# Patient Record
Sex: FEMALE | Race: WHITE | Employment: UNEMPLOYED | URBAN - METROPOLITAN AREA
[De-identification: names, ages, dates, MRNs, and addresses within clinical notes are randomized per-mention and may not be internally consistent; named-entity substitution may affect disease eponyms.]

---

## 2022-05-05 ENCOUNTER — HOSPITAL ENCOUNTER (OUTPATIENT)
Dept: PREADMISSION TESTING | Age: 42
Discharge: HOME | End: 2022-05-05

## 2022-05-05 VITALS — WEIGHT: 235 LBS | HEIGHT: 63 IN | BODY MASS INDEX: 41.64 KG/M2

## 2022-05-05 RX ORDER — SODIUM CHLORIDE, SODIUM LACTATE, POTASSIUM CHLORIDE, CALCIUM CHLORIDE 600; 310; 30; 20 MG/100ML; MG/100ML; MG/100ML; MG/100ML
125 INJECTION, SOLUTION INTRAVENOUS CONTINUOUS
Status: CANCELLED | OUTPATIENT
Start: 2022-05-05

## 2022-05-05 RX ORDER — CEFAZOLIN SODIUM/WATER 2 G/20 ML
2 SYRINGE (ML) INTRAVENOUS ONCE
Status: CANCELLED | OUTPATIENT
Start: 2022-05-10 | End: 2022-05-10

## 2022-05-05 NOTE — PERIOP NOTES
Leave all valuables at home or loved ones;to include wallets/purse, money/credit cards, electronics  such as laptops and tablets. If you want to have your prescriptions filled here, please have some form of payment with your . Please arrange for your transportation home Hold supplements 2 weeks prior to [place]. Denies any prosthetics. Patient states that the family physician is not aware of upcoming procedure. Stop nsaids 7 days prior to [place]. Do not put any lotion, jewelry, makeup, fingernail or toenail polish; no wigs, no private piercings; no tictac,gum and mouthwash. Denies sleep apnea. Denies family history of anesthesia complications. Denies shortness of breath nor chest pain while climbing stairs. Please be aware that due to unforeseen circumstances, delays may occur and your patience will be appreciated. If you ae scheduled to be discharged the same day, please plan to be with us for most of the day. If an inpatient, room assignments may be delayed as well. Our priority is to make you as comfortable as possible and to keep your family informed of your status when possible. NO dnr.   Patient to see surgeon on 5-6-22; states she has not received information for eras

## 2022-05-06 ENCOUNTER — HOSPITAL ENCOUNTER (OUTPATIENT)
Dept: LAB | Age: 42
Discharge: HOME | End: 2022-05-06

## 2022-05-06 ENCOUNTER — TRANSCRIBE ORDER (OUTPATIENT)
Dept: REGISTRATION | Age: 42
End: 2022-05-06

## 2022-05-06 DIAGNOSIS — E66.3 SEVERELY OVERWEIGHT: Primary | ICD-10-CM

## 2022-05-06 LAB
SENTARA SPECIMEN COL,SENBCF: NORMAL
SENTARA SPECIMEN COL,SENBCF: NORMAL

## 2022-05-06 PROCEDURE — 99001 SPECIMEN HANDLING PT-LAB: CPT

## 2022-05-10 ENCOUNTER — ANESTHESIA EVENT (OUTPATIENT)
Dept: SURGERY | Age: 42
End: 2022-05-10
Payer: MEDICAID

## 2022-05-10 ENCOUNTER — ANESTHESIA (OUTPATIENT)
Dept: SURGERY | Age: 42
End: 2022-05-10
Payer: MEDICAID

## 2022-05-10 ENCOUNTER — HOSPITAL ENCOUNTER (OUTPATIENT)
Age: 42
Setting detail: OUTPATIENT SURGERY
Discharge: HOME | End: 2022-05-10
Attending: OBSTETRICS & GYNECOLOGY | Admitting: OBSTETRICS & GYNECOLOGY
Payer: MEDICAID

## 2022-05-10 VITALS
SYSTOLIC BLOOD PRESSURE: 128 MMHG | OXYGEN SATURATION: 100 % | WEIGHT: 227.4 LBS | BODY MASS INDEX: 40.29 KG/M2 | RESPIRATION RATE: 20 BRPM | HEIGHT: 63 IN | TEMPERATURE: 97.6 F | HEART RATE: 84 BPM | DIASTOLIC BLOOD PRESSURE: 79 MMHG

## 2022-05-10 PROBLEM — R87.613 PAPANICOLAOU SMEAR OF CERVIX WITH HIGH GRADE SQUAMOUS INTRAEPITHELIAL LESION (HGSIL): Status: RESOLVED | Noted: 2022-05-10 | Resolved: 2022-05-10

## 2022-05-10 PROBLEM — R87.613 PAPANICOLAOU SMEAR OF CERVIX WITH HIGH GRADE SQUAMOUS INTRAEPITHELIAL LESION (HGSIL): Status: ACTIVE | Noted: 2022-05-10

## 2022-05-10 PROBLEM — R87.613 PAPANICOLAOU SMEAR OF CERVIX WITH HIGH GRADE SQUAMOUS INTRAEPITHELIAL LESION (HGSIL): Chronic | Status: RESOLVED | Noted: 2022-05-10 | Resolved: 2022-05-10

## 2022-05-10 PROBLEM — R87.613 PAPANICOLAOU SMEAR OF CERVIX WITH HIGH GRADE SQUAMOUS INTRAEPITHELIAL LESION (HGSIL): Chronic | Status: ACTIVE | Noted: 2022-05-10

## 2022-05-10 LAB
ABO + RH BLD: NORMAL
ATRIAL RATE: 81 BPM
BLOOD GROUP ANTIBODIES SERPL: NORMAL
CALCULATED P AXIS, ECG09: 23 DEGREES
CALCULATED R AXIS, ECG10: 56 DEGREES
CALCULATED T AXIS, ECG11: 55 DEGREES
DIAGNOSIS, 93000: NORMAL
HCG UR QL: NEGATIVE
P-R INTERVAL, ECG05: 174 MS
Q-T INTERVAL, ECG07: 418 MS
QRS DURATION, ECG06: 92 MS
QTC CALCULATION (BEZET), ECG08: 485 MS
SPECIMEN EXP DATE BLD: NORMAL
VENTRICULAR RATE, ECG03: 81 BPM

## 2022-05-10 PROCEDURE — 2709999900 HC NON-CHARGEABLE SUPPLY: Performed by: OBSTETRICS & GYNECOLOGY

## 2022-05-10 PROCEDURE — 77030006643: Performed by: INTERNAL MEDICINE

## 2022-05-10 PROCEDURE — 76060000033 HC ANESTHESIA 1 TO 1.5 HR: Performed by: OBSTETRICS & GYNECOLOGY

## 2022-05-10 PROCEDURE — 77030008683 HC TU ET CUF COVD -A: Performed by: INTERNAL MEDICINE

## 2022-05-10 PROCEDURE — 77030031492 HC PRT ACC BLNT AIRSEAL CNMD -B: Performed by: OBSTETRICS & GYNECOLOGY

## 2022-05-10 PROCEDURE — 77030020703 HC SEAL CANN DISP INTU -B: Performed by: OBSTETRICS & GYNECOLOGY

## 2022-05-10 PROCEDURE — 36415 COLL VENOUS BLD VENIPUNCTURE: CPT

## 2022-05-10 PROCEDURE — 88307 TISSUE EXAM BY PATHOLOGIST: CPT

## 2022-05-10 PROCEDURE — 77030020782 HC GWN BAIR PAWS FLX 3M -B: Performed by: OBSTETRICS & GYNECOLOGY

## 2022-05-10 PROCEDURE — 74011250636 HC RX REV CODE- 250/636: Performed by: OBSTETRICS & GYNECOLOGY

## 2022-05-10 PROCEDURE — 81025 URINE PREGNANCY TEST: CPT

## 2022-05-10 PROCEDURE — 76210000017 HC OR PH I REC 1.5 TO 2 HR: Performed by: OBSTETRICS & GYNECOLOGY

## 2022-05-10 PROCEDURE — 74011000250 HC RX REV CODE- 250: Performed by: OBSTETRICS & GYNECOLOGY

## 2022-05-10 PROCEDURE — 77030040361 HC SLV COMPR DVT MDII -B: Performed by: OBSTETRICS & GYNECOLOGY

## 2022-05-10 PROCEDURE — 74011000250 HC RX REV CODE- 250: Performed by: INTERNAL MEDICINE

## 2022-05-10 PROCEDURE — 76210000021 HC REC RM PH II 0.5 TO 1 HR: Performed by: OBSTETRICS & GYNECOLOGY

## 2022-05-10 PROCEDURE — 77030035277 HC OBTRTR BLDELSS DISP INTU -B: Performed by: OBSTETRICS & GYNECOLOGY

## 2022-05-10 PROCEDURE — 93005 ELECTROCARDIOGRAM TRACING: CPT

## 2022-05-10 PROCEDURE — 77030002933 HC SUT MCRYL J&J -A: Performed by: OBSTETRICS & GYNECOLOGY

## 2022-05-10 PROCEDURE — 77030008574 HC TBNG SUC IRR STRY -B: Performed by: OBSTETRICS & GYNECOLOGY

## 2022-05-10 PROCEDURE — 77030016151 HC PROTCTR LNS DFOG COVD -B: Performed by: OBSTETRICS & GYNECOLOGY

## 2022-05-10 PROCEDURE — 88309 TISSUE EXAM BY PATHOLOGIST: CPT

## 2022-05-10 PROCEDURE — 77030025805 HC MANIP UTER RUMI COOP -B: Performed by: OBSTETRICS & GYNECOLOGY

## 2022-05-10 PROCEDURE — 77030010507 HC ADH SKN DERMBND J&J -B: Performed by: OBSTETRICS & GYNECOLOGY

## 2022-05-10 PROCEDURE — 77030008477 HC STYL SATN SLP COVD -A: Performed by: INTERNAL MEDICINE

## 2022-05-10 PROCEDURE — 74011250637 HC RX REV CODE- 250/637: Performed by: INTERNAL MEDICINE

## 2022-05-10 PROCEDURE — 77030040830 HC CATH URETH FOL MDII -A: Performed by: OBSTETRICS & GYNECOLOGY

## 2022-05-10 PROCEDURE — 77030032060 HC PWDR HEMSTAT ARISTA ASRB 3GM BARD -C: Performed by: OBSTETRICS & GYNECOLOGY

## 2022-05-10 PROCEDURE — 77030022704 HC SUT VLOC COVD -B: Performed by: OBSTETRICS & GYNECOLOGY

## 2022-05-10 PROCEDURE — 76010000934 HC OR TIME 1 TO 1.5HR INTENSV - TIER 2: Performed by: OBSTETRICS & GYNECOLOGY

## 2022-05-10 PROCEDURE — 74011250636 HC RX REV CODE- 250/636: Performed by: INTERNAL MEDICINE

## 2022-05-10 PROCEDURE — 86900 BLOOD TYPING SEROLOGIC ABO: CPT

## 2022-05-10 PROCEDURE — 77030027743 HC APPL F/HEMSTAT BARD -B: Performed by: OBSTETRICS & GYNECOLOGY

## 2022-05-10 PROCEDURE — 77030013079 HC BLNKT BAIR HGGR 3M -A: Performed by: INTERNAL MEDICINE

## 2022-05-10 RX ORDER — ONDANSETRON 2 MG/ML
INJECTION INTRAMUSCULAR; INTRAVENOUS AS NEEDED
Status: DISCONTINUED | OUTPATIENT
Start: 2022-05-10 | End: 2022-05-10 | Stop reason: HOSPADM

## 2022-05-10 RX ORDER — BUPIVACAINE HYDROCHLORIDE 2.5 MG/ML
INJECTION, SOLUTION EPIDURAL; INFILTRATION; INTRACAUDAL AS NEEDED
Status: DISCONTINUED | OUTPATIENT
Start: 2022-05-10 | End: 2022-05-10 | Stop reason: HOSPADM

## 2022-05-10 RX ORDER — SCOLOPAMINE TRANSDERMAL SYSTEM 1 MG/1
1 PATCH, EXTENDED RELEASE TRANSDERMAL
Status: DISCONTINUED | OUTPATIENT
Start: 2022-05-10 | End: 2022-05-10 | Stop reason: HOSPADM

## 2022-05-10 RX ORDER — PROPOFOL 10 MG/ML
INJECTION, EMULSION INTRAVENOUS AS NEEDED
Status: DISCONTINUED | OUTPATIENT
Start: 2022-05-10 | End: 2022-05-10 | Stop reason: HOSPADM

## 2022-05-10 RX ORDER — IBUPROFEN 400 MG/1
600 TABLET ORAL
COMMUNITY

## 2022-05-10 RX ORDER — PHENYLEPHRINE HCL IN 0.9% NACL 1 MG/10 ML
SYRINGE (ML) INTRAVENOUS AS NEEDED
Status: DISCONTINUED | OUTPATIENT
Start: 2022-05-10 | End: 2022-05-10 | Stop reason: HOSPADM

## 2022-05-10 RX ORDER — SODIUM CHLORIDE 0.9 % (FLUSH) 0.9 %
5-40 SYRINGE (ML) INJECTION AS NEEDED
Status: DISCONTINUED | OUTPATIENT
Start: 2022-05-10 | End: 2022-05-10 | Stop reason: HOSPADM

## 2022-05-10 RX ORDER — INSULIN LISPRO 100 [IU]/ML
INJECTION, SOLUTION INTRAVENOUS; SUBCUTANEOUS ONCE
Status: DISCONTINUED | OUTPATIENT
Start: 2022-05-10 | End: 2022-05-10 | Stop reason: HOSPADM

## 2022-05-10 RX ORDER — HYDROMORPHONE HYDROCHLORIDE 2 MG/ML
INJECTION, SOLUTION INTRAMUSCULAR; INTRAVENOUS; SUBCUTANEOUS AS NEEDED
Status: DISCONTINUED | OUTPATIENT
Start: 2022-05-10 | End: 2022-05-10 | Stop reason: HOSPADM

## 2022-05-10 RX ORDER — SODIUM CHLORIDE, SODIUM LACTATE, POTASSIUM CHLORIDE, CALCIUM CHLORIDE 600; 310; 30; 20 MG/100ML; MG/100ML; MG/100ML; MG/100ML
50 INJECTION, SOLUTION INTRAVENOUS CONTINUOUS
Status: DISCONTINUED | OUTPATIENT
Start: 2022-05-10 | End: 2022-05-10 | Stop reason: HOSPADM

## 2022-05-10 RX ORDER — DEXTROSE MONOHYDRATE 100 MG/ML
0-250 INJECTION, SOLUTION INTRAVENOUS AS NEEDED
Status: DISCONTINUED | OUTPATIENT
Start: 2022-05-10 | End: 2022-05-10 | Stop reason: HOSPADM

## 2022-05-10 RX ORDER — ROCURONIUM BROMIDE 10 MG/ML
INJECTION, SOLUTION INTRAVENOUS AS NEEDED
Status: DISCONTINUED | OUTPATIENT
Start: 2022-05-10 | End: 2022-05-10 | Stop reason: HOSPADM

## 2022-05-10 RX ORDER — ACETAMINOPHEN 500 MG
1000 TABLET ORAL ONCE
Status: COMPLETED | OUTPATIENT
Start: 2022-05-10 | End: 2022-05-10

## 2022-05-10 RX ORDER — LIDOCAINE HYDROCHLORIDE 20 MG/ML
INJECTION, SOLUTION EPIDURAL; INFILTRATION; INTRACAUDAL; PERINEURAL AS NEEDED
Status: DISCONTINUED | OUTPATIENT
Start: 2022-05-10 | End: 2022-05-10 | Stop reason: HOSPADM

## 2022-05-10 RX ORDER — CEFAZOLIN SODIUM/WATER 2 G/20 ML
2 SYRINGE (ML) INTRAVENOUS ONCE
Status: COMPLETED | OUTPATIENT
Start: 2022-05-10 | End: 2022-05-10

## 2022-05-10 RX ORDER — HYDROMORPHONE HYDROCHLORIDE 1 MG/ML
0.2 INJECTION, SOLUTION INTRAMUSCULAR; INTRAVENOUS; SUBCUTANEOUS AS NEEDED
Status: DISCONTINUED | OUTPATIENT
Start: 2022-05-10 | End: 2022-05-10 | Stop reason: HOSPADM

## 2022-05-10 RX ORDER — KETOROLAC TROMETHAMINE 15 MG/ML
INJECTION, SOLUTION INTRAMUSCULAR; INTRAVENOUS AS NEEDED
Status: DISCONTINUED | OUTPATIENT
Start: 2022-05-10 | End: 2022-05-10 | Stop reason: HOSPADM

## 2022-05-10 RX ORDER — SODIUM CHLORIDE 0.9 % (FLUSH) 0.9 %
5-40 SYRINGE (ML) INJECTION EVERY 8 HOURS
Status: DISCONTINUED | OUTPATIENT
Start: 2022-05-10 | End: 2022-05-10 | Stop reason: HOSPADM

## 2022-05-10 RX ORDER — DEXAMETHASONE SODIUM PHOSPHATE 4 MG/ML
INJECTION, SOLUTION INTRA-ARTICULAR; INTRALESIONAL; INTRAMUSCULAR; INTRAVENOUS; SOFT TISSUE AS NEEDED
Status: DISCONTINUED | OUTPATIENT
Start: 2022-05-10 | End: 2022-05-10 | Stop reason: HOSPADM

## 2022-05-10 RX ORDER — SODIUM CHLORIDE, SODIUM LACTATE, POTASSIUM CHLORIDE, CALCIUM CHLORIDE 600; 310; 30; 20 MG/100ML; MG/100ML; MG/100ML; MG/100ML
125 INJECTION, SOLUTION INTRAVENOUS CONTINUOUS
Status: DISCONTINUED | OUTPATIENT
Start: 2022-05-10 | End: 2022-05-10 | Stop reason: HOSPADM

## 2022-05-10 RX ORDER — MIDAZOLAM HYDROCHLORIDE 1 MG/ML
INJECTION, SOLUTION INTRAMUSCULAR; INTRAVENOUS AS NEEDED
Status: DISCONTINUED | OUTPATIENT
Start: 2022-05-10 | End: 2022-05-10 | Stop reason: HOSPADM

## 2022-05-10 RX ORDER — FENTANYL CITRATE 50 UG/ML
INJECTION, SOLUTION INTRAMUSCULAR; INTRAVENOUS AS NEEDED
Status: DISCONTINUED | OUTPATIENT
Start: 2022-05-10 | End: 2022-05-10 | Stop reason: HOSPADM

## 2022-05-10 RX ORDER — FENTANYL CITRATE 50 UG/ML
50 INJECTION, SOLUTION INTRAMUSCULAR; INTRAVENOUS
Status: DISCONTINUED | OUTPATIENT
Start: 2022-05-10 | End: 2022-05-10 | Stop reason: HOSPADM

## 2022-05-10 RX ORDER — MAGNESIUM SULFATE 100 %
4 CRYSTALS MISCELLANEOUS AS NEEDED
Status: DISCONTINUED | OUTPATIENT
Start: 2022-05-10 | End: 2022-05-10 | Stop reason: HOSPADM

## 2022-05-10 RX ADMIN — FENTANYL CITRATE 50 MCG: 50 INJECTION, SOLUTION INTRAMUSCULAR; INTRAVENOUS at 13:48

## 2022-05-10 RX ADMIN — SODIUM CHLORIDE, SODIUM LACTATE, POTASSIUM CHLORIDE, AND CALCIUM CHLORIDE 125 ML/HR: 600; 310; 30; 20 INJECTION, SOLUTION INTRAVENOUS at 14:35

## 2022-05-10 RX ADMIN — ACETAMINOPHEN 1000 MG: 500 TABLET ORAL at 11:43

## 2022-05-10 RX ADMIN — Medication 100 MCG: at 12:25

## 2022-05-10 RX ADMIN — ROCURONIUM BROMIDE 80 MG: 10 INJECTION, SOLUTION INTRAVENOUS at 12:15

## 2022-05-10 RX ADMIN — HYDROMORPHONE HYDROCHLORIDE 0.2 MG: 1 INJECTION, SOLUTION INTRAMUSCULAR; INTRAVENOUS; SUBCUTANEOUS at 14:03

## 2022-05-10 RX ADMIN — PROPOFOL 250 MG: 10 INJECTION, EMULSION INTRAVENOUS at 12:14

## 2022-05-10 RX ADMIN — KETOROLAC TROMETHAMINE 30 MG: 15 INJECTION, SOLUTION INTRAMUSCULAR; INTRAVENOUS at 13:31

## 2022-05-10 RX ADMIN — LIDOCAINE HYDROCHLORIDE 100 MG: 20 INJECTION, SOLUTION INTRAVENOUS at 12:14

## 2022-05-10 RX ADMIN — DEXAMETHASONE SODIUM PHOSPHATE 8 MG: 4 INJECTION, SOLUTION INTRAMUSCULAR; INTRAVENOUS at 12:29

## 2022-05-10 RX ADMIN — SODIUM CHLORIDE, SODIUM LACTATE, POTASSIUM CHLORIDE, AND CALCIUM CHLORIDE 125 ML/HR: 600; 310; 30; 20 INJECTION, SOLUTION INTRAVENOUS at 12:06

## 2022-05-10 RX ADMIN — FENTANYL CITRATE 50 MCG: 50 INJECTION, SOLUTION INTRAMUSCULAR; INTRAVENOUS at 12:14

## 2022-05-10 RX ADMIN — FENTANYL CITRATE 50 MCG: 50 INJECTION, SOLUTION INTRAMUSCULAR; INTRAVENOUS at 13:35

## 2022-05-10 RX ADMIN — SUGAMMADEX 206 MG: 100 INJECTION, SOLUTION INTRAVENOUS at 13:27

## 2022-05-10 RX ADMIN — FENTANYL CITRATE 50 MCG: 50 INJECTION, SOLUTION INTRAMUSCULAR; INTRAVENOUS at 13:43

## 2022-05-10 RX ADMIN — Medication 2 G: at 12:26

## 2022-05-10 RX ADMIN — HYDROMORPHONE HYDROCHLORIDE 0.5 MG: 2 INJECTION, SOLUTION INTRAMUSCULAR; INTRAVENOUS; SUBCUTANEOUS at 12:33

## 2022-05-10 RX ADMIN — SODIUM CHLORIDE, SODIUM LACTATE, POTASSIUM CHLORIDE, AND CALCIUM CHLORIDE 50 ML/HR: 600; 310; 30; 20 INJECTION, SOLUTION INTRAVENOUS at 11:43

## 2022-05-10 RX ADMIN — FENTANYL CITRATE 50 MCG: 50 INJECTION, SOLUTION INTRAMUSCULAR; INTRAVENOUS at 14:55

## 2022-05-10 RX ADMIN — ONDANSETRON HYDROCHLORIDE 4 MG: 2 INJECTION INTRAMUSCULAR; INTRAVENOUS at 13:26

## 2022-05-10 RX ADMIN — HYDROMORPHONE HYDROCHLORIDE 0.2 MG: 1 INJECTION, SOLUTION INTRAMUSCULAR; INTRAVENOUS; SUBCUTANEOUS at 14:35

## 2022-05-10 RX ADMIN — HYDROMORPHONE HYDROCHLORIDE 0.2 MG: 1 INJECTION, SOLUTION INTRAMUSCULAR; INTRAVENOUS; SUBCUTANEOUS at 14:23

## 2022-05-10 RX ADMIN — HYDROMORPHONE HYDROCHLORIDE 0.2 MG: 1 INJECTION, SOLUTION INTRAMUSCULAR; INTRAVENOUS; SUBCUTANEOUS at 13:53

## 2022-05-10 RX ADMIN — FENTANYL CITRATE 50 MCG: 50 INJECTION, SOLUTION INTRAMUSCULAR; INTRAVENOUS at 13:25

## 2022-05-10 RX ADMIN — FENTANYL CITRATE 50 MCG: 50 INJECTION, SOLUTION INTRAMUSCULAR; INTRAVENOUS at 14:45

## 2022-05-10 RX ADMIN — HYDROMORPHONE HYDROCHLORIDE 0.2 MG: 1 INJECTION, SOLUTION INTRAMUSCULAR; INTRAVENOUS; SUBCUTANEOUS at 14:13

## 2022-05-10 RX ADMIN — MIDAZOLAM 2 MG: 1 INJECTION INTRAMUSCULAR; INTRAVENOUS at 12:09

## 2022-05-10 NOTE — ANESTHESIA PREPROCEDURE EVALUATION
Relevant Problems   No relevant active problems       Anesthetic History   No history of anesthetic complications            Review of Systems / Medical History  Patient summary reviewed, nursing notes reviewed and pertinent labs reviewed    Pulmonary          Smoker         Neuro/Psych   Within defined limits           Cardiovascular  Within defined limits                Exercise tolerance: >4 METS     GI/Hepatic/Renal  Within defined limits              Endo/Other        Morbid obesity     Other Findings              Physical Exam    Airway  Mallampati: II  TM Distance: > 6 cm  Neck ROM: normal range of motion   Mouth opening: Normal     Cardiovascular    Rhythm: regular  Rate: normal         Dental  No notable dental hx       Pulmonary  Breath sounds clear to auscultation               Abdominal  GI exam deferred       Other Findings            Anesthetic Plan    ASA: 3  Anesthesia type: general    Monitoring Plan: Continuous noninvasive hemodynamic monitoring      Induction: Intravenous  Anesthetic plan and risks discussed with: Patient

## 2022-05-10 NOTE — ANESTHESIA POSTPROCEDURE EVALUATION
Procedure(s):  ROBOTIC TOTAL LAPAROSCOPIC HYSTERECTOMY,BILATERAL SALPINGECTOMIES, CYSTOSCOPY. general    Anesthesia Post Evaluation      Multimodal analgesia: multimodal analgesia used between 6 hours prior to anesthesia start to PACU discharge  Patient location during evaluation: PACU  Patient participation: complete - patient participated  Level of consciousness: sleepy but conscious  Pain score: 5  Pain management: satisfactory to patient  Airway patency: patent  Anesthetic complications: no  Cardiovascular status: blood pressure returned to baseline and hemodynamically stable  Respiratory status: room air, nonlabored ventilation and spontaneous ventilation  Hydration status: euvolemic  Post anesthesia nausea and vomiting:  controlled  Final Post Anesthesia Temperature Assessment:  Normothermia (36.0-37.5 degrees C)      INITIAL Post-op Vital signs:   Vitals Value Taken Time   /100 05/10/22 1519   Temp     Pulse 76 05/10/22 1522   Resp 16 05/10/22 1522   SpO2 97 % 05/10/22 1522   Vitals shown include unvalidated device data.

## 2022-05-10 NOTE — PERIOP NOTES
Reviewed PTA medication list with patient/caregiver and patient/caregiver denies any additional medications. Patient admits to having a responsible adult care for them at home for at least 24 hours after surgery. Patient encouraged to use gown warming system and informed that using said warming gown to regulate body temperature prior to a procedure has been shown to help reduce the risks of blood clots and infection. Patient's pharmacy of choice verified and documented in PTA medication section. Dual skin assessment & fall risk band verification completed with Gena Salgado RN.

## 2022-05-10 NOTE — DISCHARGE INSTRUCTIONS
[facility], [address], [facility] [address], [address]  [facility], [address], [address], [place], [address] [facility]  Office: [phone]  Fax:    [phone]    PROCEDURE: Procedure(s):  ROBOTIC TOTAL LAPAROSCOPIC HYSTERECTOMY,BILATERAL SALPINGECTOMIES, CYSTOSCOPY    NOTIFY [facility] IMMEDIATELY IF ANY OF THE FOLLOWING OCCUR:     You are unable to urinate. Urgency to urinate is not uncommon.  Excessive vaginal bleeding > 1 maxi pad an hour for more then 2 hours straight.  Temperature above 101.0° and / or chills.  You are nauseous and / or vomiting and you cannot hold down any fluids.  Your pain is not controlled with the pain medication prescribed. SPECIAL CONSIDERATIONS:      Do not drive for at least 24 hours after the procedure and until you are no longer taking narcotic pain medication and you are able to move and react without hesitation.  You may return to work in 2 weeks. [x] Pelvic rest (nothing in the vagina) for 6 weeks. [x] No heavy lifting over 10 pounds & no strenuous exercise for 2 weeks. [] Other instructions:         MEDICATIONS: PROVIDED AT DISCHARGE OR PREVIOUSLY PRESCRIBED AT PRE OPERATIVE APPOINTMENT WITH [facility] --  Narcotic Pain Med. [x]  Norco/Vicodin   []  Percocet®   []  Dilaudid®    Non-narcotic Pain Med. [x]   Ibuprofen        Antibiotics   []  Cipro®   []  Keflex®     []  Bactrim DS®       Urgency   []   Vesicare®       Nausea      []    Zofran®     []    Phenergan®       Other   []    Colace          [x] Rx provided to patient at pre operative appointment  [] Rx sent electronically today  [] Rx printed today      Our office staff will call you for a post operative check in 1-2 days. If you have not already scheduled your post operative appointment please do so with our office staff during this phone call. Your post operative appointment should be in 2-4 weeks.     Please contact [facility] at [phone] or go to the nearest Emergency Department / Urgent Care facility for any other medical questions or concerns. DISCHARGE SUMMARY from Nurse    PATIENT INSTRUCTIONS:    After general anesthesia or intravenous sedation, for 24 hours or while taking prescription Narcotics:  · Limit your activities  · Do not drive and operate hazardous machinery  · Do not make important personal or business decisions  · Do  not drink alcoholic beverages  · If you have not urinated within 8 hours after discharge, please contact your surgeon on call. Report the following to your surgeon:  · Excessive pain, swelling, redness or odor of or around the surgical area  · Temperature over 100.5  · Nausea and vomiting lasting longer than 4 hours or if unable to take medications  · Any signs of decreased circulation or nerve impairment to extremity: change in color, persistent  numbness, tingling, coldness or increase pain  · Any questions    What to do at Home:  Recommended activity: Activity as tolerated and No driving while on analgesics,     If you experience any of the following symptoms fever, chills, persistent nausea/vomitting and/or , please follow up with Dr. Song Kahn. *  Please give a list of your current medications to your Primary Care Provider. *  Please update this list whenever your medications are discontinued, doses are      changed, or new medications (including over-the-counter products) are added. *  Please carry medication information at all times in case of emergency situations. These are general instructions for a healthy lifestyle:    No smoking/ No tobacco products/ Avoid exposure to second hand smoke  Surgeon General's Warning:  Quitting smoking now greatly reduces serious risk to your health.     Obesity, smoking, and sedentary lifestyle greatly increases your risk for illness    A healthy diet, regular physical exercise & weight monitoring are important for maintaining a healthy lifestyle    You may be retaining fluid if you have a history of heart failure or if you experience any of the following symptoms:  Weight gain of 3 pounds or more overnight or 5 pounds in a week, increased swelling in our hands or feet or shortness of breath while lying flat in bed. Please call your doctor as soon as you notice any of these symptoms; do not wait until your next office visit. The discharge information has been reviewed with the patient and caregiver. The patient and caregiver verbalized understanding. Discharge medications reviewed with the patient and caregiver and appropriate educational materials and side effects teaching were provided.   ___________________________________________________________________________________________________________________________________    Patient armband removed and shredded

## 2022-05-10 NOTE — INTERVAL H&P NOTE
Update History & Physical    The Patient's History and Physical of May 2022,    was reviewed with the patient and I examined the patient. There was no change. The surgical site was confirmed by the patient and me. Plan:  The risk, benefits, expected outcome, and alternative to the recommended procedure have been discussed with the patient. Patient understands and wants to proceed with the procedure.     Electronically signed by Wellington Paulson MD on 5/10/2022 at 11:44 AM

## 2022-05-10 NOTE — OP NOTES
Pre Operative Diagnosis: HGSIL  Post Operative Diagnosis: LAURA  Procedure:Procedure(s):  ROBOTIC [address], CYSTOSCOPY  Surgeon: Marialuisa Galeana M.D. Surgical Assistant: Circ-1: Emma Clifton RN  Scrub Tech-1: Radha Quezada  Surg Asst-1: Marcus Postal  Implants: * No implants in log *  Anesthesia: General  EBL: 25 ml  Findings: 25 mL  Specimens:   ID Type Source Tests Collected by Time Destination   1 : UTERUS, CERVIX, BILATERAL FALLOPIAN TUBES Preservative Uterus with Bilateral Fallopian Tubes  Simón Osorio MD 5/10/2022 1306 Pathology     Complications: none   Disposition: to the recovery room in stable condition   Procedure: The patient was brought to the operating room where patient identification and surgery identification were completed with the patient and the OR team. The patient was transferred to the OR table and GETA was obtained without difficulty. The left and right arms were padded and tucked. SCD's were on and activated. The patient was placed in the lithotomy position in adjustable Singh stirrups and prepped and draped in the normal sterile fashion. Patient identification and surgery identification were repeated with the surgeon and the OR team.   A bivalve speculum was placed vaginally and the anterior lip of the cervix was grasped with a single toothed tenaculum. The uterus was sounded to 8 cm. A [employer] uterine manipulator was placed in the normal fashion. All other instruments were removed. A bennett catheter was placed with clear urine noted. Attention was turned to the abdomen where a total of 10 cc of 0.25% Marcaine was injected into the infraumbilical and left and right mid abdominal  incision sites. A 5 mm incision was made infraumbilically and a Veress needle was introduced into the abdominal cavity with anterior tenting of the abdominal wall. Pneumoperitoneum was obtained with 1.4 liters of CO2 gas.  The Veress needle was removed and robotic port was placed under direct visualization without complication. The patient was placed in steep [place] and a 5 mm incision was made in the LMQ and a 5 mm incision was made in the RMQ. Robotic ports were placed under direct visualization without complication. The pelvis was examined with the above noted findings. A fourth 8 mm port was placed on the patients right inferior to the previously placed RMQ port. The [employer] robot was then positioned and attached in the normal fashion with introduction of instrumentation completed in the normal fashion. The left cornual region of the uterus was grasped and the fallopian tube was removed and the utero-ovarian, round and broad ligaments were transected with the bipolar and monopolar scissors. The uterine vessel bundle was skeletonized and transected with the Bipolar for cauterization and the Monopolar scissors for transection. The bladder flap was created across the lower uterine segment. Attention was turned to the right cornual region where the same transections were performed. The Zara cup was palpated and the vaginal space was entered using the monopolar scissors A circumferential incision was then completed using the monopolar scissors to remove all remaining uterine attachments. The uterus was then removed vaginally. The edges of the vaginal cuff were examined and vaginal cuff closure was then completed using the 2-0 barbed v care suture. The pelvis was copiously irrigated and all fluid was removed. Hemostasis was again noted. The ureters were identified bilaterally with normal peristalsis noted. The lateral instruments were removed and the lateral ports were removed with hemostasis noted. The laparoscope and umbilical port were then removed. The incisions were closed with 4-0 Monocryl in a subcuticular fashion and then covered with skin glue. The bennett catheter was removed with 150 cc of clear urine noted.  Cystoscopy was performed in the nl fashion with no evidence of bladder injury visible. Bilateral efflux of urine from the ureters was noted. Sponge, lap, needle and instrument counts were correct x 2. The patient was transferred to the  extubated, in stable condition.    Jaqueline Timmons M.D.

## 2023-02-03 DIAGNOSIS — E66.3 SEVERELY OVERWEIGHT: Primary | ICD-10-CM

## 2023-02-04 DIAGNOSIS — E66.3 SEVERELY OVERWEIGHT: Primary | ICD-10-CM

## 2023-02-06 DIAGNOSIS — E66.3 SEVERELY OVERWEIGHT: Primary | ICD-10-CM

## 2023-02-07 DIAGNOSIS — E66.3 SEVERELY OVERWEIGHT: Primary | ICD-10-CM

## (undated) DEVICE — COVER MPLR TIP CRV SCIS ACC DA VINCI

## (undated) DEVICE — SYR 10ML LUER LOK 1/5ML GRAD --

## (undated) DEVICE — SUTURE ABSORBABLE MONOFILAMENT 0 GS22 9 IN GRN V-LOC 180 VLOCL2246

## (undated) DEVICE — ARM DRAPE

## (undated) DEVICE — ROBOTIC PACK: Brand: MEDLINE INDUSTRIES, INC.

## (undated) DEVICE — TOTAL TRAY, DB, 100% SILI FOLEY, 16FR 10: Brand: MEDLINE

## (undated) DEVICE — SYSTEM ES CUP DIA4CM PNEUMO OCCL BLLN DISP FOR CLIN POS

## (undated) DEVICE — REM POLYHESIVE ADULT PATIENT RETURN ELECTRODE: Brand: VALLEYLAB

## (undated) DEVICE — GLOVE SURG SZ 65 CRM LTX FREE POLYISOPRENE POLYMER BEAD ANTI

## (undated) DEVICE — SEAL UNIV 5-8MM DISP BX/10 -- DA VINCI XI - SNGL USE

## (undated) DEVICE — APPLICATOR SURG XL L38CM FOR ARISTA ABSRB HEMSTAT FLEXITIP

## (undated) DEVICE — AIRSEAL 5 MM ACCESS PORT AND LOW PROFILE OBTURATOR WITH BLADELESS OPTICAL TIP, 120 MM LENGTH: Brand: AIRSEAL

## (undated) DEVICE — AGENT HEMSTAT 3GM PURIFIED PLNT STARCH PWD ABSRB ARISTA AH

## (undated) DEVICE — PREP SKN CHLRAPRP APL 26ML STR --

## (undated) DEVICE — COLUMN DRAPE

## (undated) DEVICE — TRI-LUMEN FILTERED TUBE SET WITH ACTIVATED CHARCOAL FILTER: Brand: AIRSEAL

## (undated) DEVICE — DERMABOND SKIN ADH 0.7ML -- DERMABOND ADVANCED 12/BX

## (undated) DEVICE — SOL IRRIGATION INJ NACL 0.9% 500ML BTL

## (undated) DEVICE — PAD PT POS 36 IN SURGYPAD DISP

## (undated) DEVICE — SOLUTION IV D10 1000 ML INJ BG

## (undated) DEVICE — BLADELESS OBTURATOR: Brand: WECK VISTA

## (undated) DEVICE — DRAPE XR C ARM 41X74IN LF --

## (undated) DEVICE — VISUALIZATION SYSTEM: Brand: CLEARIFY

## (undated) DEVICE — TOWEL,OR,DSP,ST,BLUE,STD,4/PK,20PK/CS: Brand: MEDLINE

## (undated) DEVICE — SUT MONOCRYL PLUS UD 4-0 --

## (undated) DEVICE — DISPOSABLE SUCTION/IRRIGATOR TUBE SET WITH TIP: Brand: AHTO

## (undated) DEVICE — GLOVE ORANGE PI 7   MSG9070

## (undated) DEVICE — GARMENT,MEDLINE,DVT,INT,CALF,MED, GEN2: Brand: MEDLINE

## (undated) DEVICE — SOLUTION LACTATED RINGERS INJECTION USP